# Patient Record
Sex: MALE | Race: WHITE | NOT HISPANIC OR LATINO | Employment: FULL TIME | URBAN - METROPOLITAN AREA
[De-identification: names, ages, dates, MRNs, and addresses within clinical notes are randomized per-mention and may not be internally consistent; named-entity substitution may affect disease eponyms.]

---

## 2019-03-13 ENCOUNTER — OFFICE VISIT (OUTPATIENT)
Dept: URGENT CARE | Facility: URGENT CARE | Age: 29
End: 2019-03-13
Payer: COMMERCIAL

## 2019-03-13 VITALS
WEIGHT: 185 LBS | DIASTOLIC BLOOD PRESSURE: 87 MMHG | TEMPERATURE: 98.2 F | HEART RATE: 62 BPM | OXYGEN SATURATION: 95 % | SYSTOLIC BLOOD PRESSURE: 141 MMHG

## 2019-03-13 DIAGNOSIS — S09.90XA INJURY OF HEAD, INITIAL ENCOUNTER: Primary | ICD-10-CM

## 2019-03-13 DIAGNOSIS — S01.01XA LACERATION OF SCALP, INITIAL ENCOUNTER: ICD-10-CM

## 2019-03-13 PROCEDURE — 12001 RPR S/N/AX/GEN/TRNK 2.5CM/<: CPT | Performed by: FAMILY MEDICINE

## 2019-03-14 NOTE — PATIENT INSTRUCTIONS
Patient Education     * Head Injury, no wake-up (Adult)    You have a head injury. It doesn t look serious right now. But symptoms of a more serious problem may appear later. This could be a mild brain injury (concussion), or bruising or bleeding in the brain. You or someone caring for you will need to watch for the symptoms listed below for at least the next 24 hours. When you get home, be sure to follow any care instructions you re given.  Home care  Watch for the following symptoms  Call 9-1-1 if you have any of these symptoms over the next hours or days:  1. Severe headache or headache that gets worse  2. Nausea and repeated throwing up (vomiting)  3. Dizziness or changes in eyesight (vision changes)  4. Bothered by bright light or loud noise  5. Sleep changes (trouble falling asleep or unusually sleepy or groggy)  6. Changes in the way you act or talk (personality or speech changes)  7. Feeling confused or forgetting things (memory loss)  8. Trouble walking or clumsiness  9. Passing out or fainting (even for a short time)  10. Won t wake up  11. Stiff neck  12. Weakness or numbness in any part of the body  13. Seizures  Do you have signs of a concussion?  A concussion is an injury to the brain caused by shaking. If you were knocked out, that s a sign you may have a concussion. But watch for these signs, too:    Upset stomach (nausea)    Throwing up (vomiting)    Feeling dizzy or confused    Headache    Loss of memory  If you have any of the above signs:    Don t return to sports or any activity where you might hurt your head again.    Wait until all symptoms have been gone for 2 full weeks, and your doctor has cleared you to return to sports.  You could get a serious brain injury if you get hurt again before fully recovering.  General care    You don t need to stay awake or be woken during the night.    For pain, you may use:  ? Tylenol (acetaminophen) 650 to 1000 mg every 6 hours OR  ? Motrin or Advil  (ibuprofen) 600 mg every 6 to 8 hours with food  NOTE: If you have chronic liver or kidney disease or ever had a stomach ulcer or GI bleeding, talk with your doctor before using these medicines.    Don t take aspirin after a head injury.    For swelling and to help with pain: Put a cold source to the injured area for up to 20 minutes at a time. Do this as often as directed. Use a cold pack or bag of ice wrapped in a thin towel. Never put a cold source directly on the skin.    For cuts and scrapes: Care for them as the doctor or nurse directed.    For the next 24 hours (or longer, if your doctor advises it):  ? Don t drink alcohol, use sedatives, or use other medicines that make you sleepy.  ? Don t drive or use large machines.  ? Don t do anything tiring, such as heavy lifting or straining.  ? Limit tasks where you need to focus or concentrate. This includes reading, using a smartphone or computer, watching TV and playing video games.  ? Don t return to sports or other activities that could cause another head injury.  Follow-up care  Follow up with your doctor if symptoms don t get better after 24 hours, or as directed.  When to call the doctor  Call your doctor right away if any of these occur:    Pain doesn t get better or gets worse    New or increased swelling or bruising    Fever of 100.4 F (38 C) or higher, or as directed by your doctor    Increased redness, warmth, draining or bleeding from the injury    Fluid drainage or bleeding from the nose or ears    Any pushed-in spot or bony bump in the injured area  Date Last Reviewed: 9/26/2015 2000-2018 [employer]. [address] [place] [address], [place] [address]. All rights reserved. This information is not intended as a substitute for professional medical care. Always follow your healthcare professional's instructions.  This information has been modified by your health care provider with permission from the publisher.  Modifications clinically reviewed  by Spencer Pichadro DO, MBA, FACOEP, Director of Physician Informatics for Emergency Medicine, [facility] on 8/27/18.           Patient Education     Scalp Laceration: Sutures or Staples  A laceration is a cut through the skin. A scalp laceration may require stitches (sutures) or staples. There are a lot of blood vessels in the scalp. Because of this, significant bleeding is common with scalp cuts.  Home care  The following guidelines will help you care for your laceration at home:    During the first 2 days you may carefully rinse your hair in the shower to remove blood and glass or dirt particles. After 2 days you may shower and shampoo your hair normally.    Have someone help you clean your wound every day:  ? In the shower, wash the area with soap and water. Use a wet cotton swab to loosen and remove any blood or crust that forms.  ? After cleaning, keep the wound clean and dry. Talk with your doctor about applying antibiotic ointment to the wound. Apply a fresh bandage.    Don't put your head underwater until the stitches or staples have been removed. This means no swimming.    Your doctor may prescribe an antibiotic cream or ointment to prevent infection. Do not stop taking this medication until you have finished the prescribed course or your doctor tells you to stop.    Your doctor may prescribe medications for pain. If no pain medicines were prescribed, you can use over-the-counter pain medicines. Follow instructions for taking these medications. Talk with your doctor before using these medicines if you have chronic liver or kidney disease. Also talk with your doctor if you have ever had a stomach ulcer or GI bleeding.  Follow-up care  Follow up with your healthcare provider, or as advised. Check the wound daily for the signs of infection listed below. Stitches or staples are usually removed from the scalp in about 7 to 14 days.  Call 911  Call 911 if any of these occur:    Bleeding can't be  controlled by direct pressure  When to seek medical advice  Call your healthcare provider right away if any of these occur:    Signs of infection, including increasing pain in the wound, redness, swelling, or pus coming from the wound    Fever of 100.4 F (38 C) or higher, or as directed by your healthcare provider    Stitches or staples come apart or fall out before your next appointment    Wound edges re-open  Date Last Reviewed: 10/1/2016    4102-5564 [employer]. [address] [place] [address]. All rights reserved. This information is not intended as a substitute for professional medical care. Always follow your healthcare professional's instructions.

## 2019-03-14 NOTE — PROGRESS NOTES
SUBJECTIVE:     Chief Complaint   Patient presents with     Urgent Care     Head Injury     Hit head on electrical box at around 7pm. today.      Kennedy Clifton is a 28 year old male who presents to the clinic with a laceration on the  scalp sustained 1.5 hour(s) ago.  This is a non-work related injury.    Mechanism of injury: HEAD HIT ELECTRICAL BOX WHEN HE WAS STANDING UP .  Family History   Problem Relation Age of Onset     Family History Negative Mother      Family History Negative Father        Associated symptoms: Denies numbness, weakness, or loss of function  Last tetanus booster within 10 years: no    EXAM:   The patient appears today in alert,no apparent distress distress  VITALS: /87   Pulse 62   Temp 98.2  F (36.8  C) (Tympanic)   Wt 83.9 kg (185 lb)   SpO2 95%     Size of laceration: 1.5 centimeters on the posterior scalp area   Alert and oriented  Left eye normal, pupil reactive, normal conjunctiva, normal fundus, normal cornea.  Right eye normal, pupil reactive, normal conjunctiva, normal fundus, normal cornea.  cv Regular rate and rhythm.   Chest - good effort , no sob   Characteristics of the laceration: active bleeding  Tendon function intact: not applicable  Sensation to light touch intact: yes  Pulses intact: not applicable  Picture included in patient's chart: no    Assessment:     Injury of head, initial encounter  Laceration of scalp, initial encounter    PLAN:  PROCEDURE NOTE::  LET was applied.  Wound cleaned with HIBICLENS  5 staples used for re-approximation  After care instructions:  Keep wound clean and dry for the next 24-48 hours  Staples out in 8 days  Signs of infection discussed today  Apply anti-bacterial ointment for 3 days  Discussed with patient that if notices any worsening headache or vision changes or worsening nausea symptoms should follow-up    Gwen Santacruz MD

## 2019-03-21 ENCOUNTER — OFFICE VISIT (OUTPATIENT)
Dept: URGENT CARE | Facility: URGENT CARE | Age: 29
End: 2019-03-21
Payer: COMMERCIAL

## 2019-03-21 DIAGNOSIS — Z53.9 DIAGNOSIS NOT YET DEFINED: ICD-10-CM

## 2019-09-22 PROBLEM — Z53.9 DIAGNOSIS NOT YET DEFINED: Status: ACTIVE | Noted: 2019-09-22

## 2023-07-08 ENCOUNTER — HOSPITAL ENCOUNTER (EMERGENCY)
Facility: CLINIC | Age: 33
Discharge: HOME | End: 2023-07-08
Attending: EMERGENCY MEDICINE | Admitting: EMERGENCY MEDICINE
Payer: COMMERCIAL

## 2023-07-08 ENCOUNTER — APPOINTMENT (OUTPATIENT)
Dept: GENERAL RADIOLOGY | Facility: CLINIC | Age: 33
End: 2023-07-08
Attending: EMERGENCY MEDICINE
Payer: COMMERCIAL

## 2023-07-08 VITALS
HEIGHT: 72 IN | HEART RATE: 61 BPM | DIASTOLIC BLOOD PRESSURE: 84 MMHG | BODY MASS INDEX: 24.38 KG/M2 | WEIGHT: 180 LBS | RESPIRATION RATE: 16 BRPM | OXYGEN SATURATION: 99 % | TEMPERATURE: 98 F | SYSTOLIC BLOOD PRESSURE: 135 MMHG

## 2023-07-08 DIAGNOSIS — S61.012A THUMB LACERATION, LEFT, INITIAL ENCOUNTER: ICD-10-CM

## 2023-07-08 DIAGNOSIS — S61.209A EXTENSOR TENDON LACERATION OF FINGER WITH OPEN WOUND, INITIAL ENCOUNTER: ICD-10-CM

## 2023-07-08 DIAGNOSIS — S56.429A EXTENSOR TENDON LACERATION OF FINGER WITH OPEN WOUND, INITIAL ENCOUNTER: ICD-10-CM

## 2023-07-08 PROCEDURE — 73140 X-RAY EXAM OF FINGER(S): CPT | Mod: LT

## 2023-07-08 PROCEDURE — 12002 RPR S/N/AX/GEN/TRNK2.6-7.5CM: CPT

## 2023-07-08 PROCEDURE — 99284 EMERGENCY DEPT VISIT MOD MDM: CPT | Mod: 25

## 2023-07-08 PROCEDURE — 29130 APPL FINGER SPLINT STATIC: CPT | Mod: LT

## 2023-07-08 ASSESSMENT — ACTIVITIES OF DAILY LIVING (ADL)
ADLS_ACUITY_SCORE: 35
ADLS_ACUITY_SCORE: 35

## 2023-07-08 NOTE — ED TRIAGE NOTES
Pt cut his left thumb today with a  today around 11am   Pt was cutting a piece of plastic and it cut the top of his thumb

## 2023-07-08 NOTE — ED PROVIDER NOTES
"  History     Chief Complaint:  Hand Injury     HPI   Kennedy Clifton is a 32 year old male who presents to the ED for evaluation of a laceration to his left thumb. States he sustained the wound while cutting a piece of plastic with a  denies other injuries. Denies medical problems. Patient writes with his left hand but \"does most things with his right\". Last tetanus shot was 01/04/2016. He denies any numbness, tingling, or weakness. He reports he can move the thumb normally. He denies any other symptoms.     Independent Historian:   None - Patient Only    Review of External Notes:  None    ROS:  See HPI    Allergies:  No Known Allergies     Physical Exam     Patient Vitals for the past 24 hrs:   BP Temp Temp src Pulse Resp SpO2 Height Weight   07/08/23 1200 (!) 154/77 98  F (36.7  C) Oral 99 16 99 % 1.829 m (6') 81.6 kg (180 lb)        Physical Exam  General: Resting comfortably  Head:  Scalp, face, and head appear normal  Eyes:  Pupils equal, round    Conjunctivae noninjected and sclera white  ENT:    The nose is normal    Ears/pinnae are normal  Neck:  Normal range of motion  MSK:  3cm laceration to the left thumb overlying the dorsal and medial proximal phalanx. No FB. + extensor tendon laceration. SILT in the fingertip. Remainder of hand and fingers otherwise normal and atraumatic  Skin:  No rash or lesions noted.  Neuro:  Speech is normal and fluent    Moves all extremities spontaneously  Psych: Awake, Alert. Normal affect      Appropriate interactions           Emergency Department Course     Imaging:  Fingers XR, 2-3 views, left   Final Result   IMPRESSION: Normal joint spaces and alignment. No fracture. No foreign body.            Report per radiology    Procedures    Digital Block       Procedure: Digital Block    Indication: Laceration    Consent: Verbal    Preparation: Povidone-iodine    Anesthesia: A digital block was performed with Bupivacaine - 0.5% on the affected digit.     Location: L " first (thumb) finger    Procedure Detail: A digital block was performed on the indicated digit with the above anesthetic.     Patient status: The patient tolerated the procedure well: Yes. There were no complications.      Laceration Repair      Procedure: Laceration Repair    Indication: Laceration    Consent: Verbal    Location: Left Thumb    Length: 3 cm    Preparation: Irrigation with Sterile Saline.    Anesthesia/Sedation: Digital block as noted above.      Treatment/Exploration: Wound explored, no foreign bodies found  - Complete extensor tendon laceration.     Closure: The wound was closed with one layer. Skin/superficial layer was closed with 6 x 4-0 Nylon using Interrupted sutures.     Patient Status: The patient tolerated the procedure well: Yes. There were no complications.          Emergency Department Course & Assessments:       Interventions:  Medications - No data to display     Assessments, Independent Interpretation, Consult/Discussion of ManagementTests:  ED Course as of 07/08/23 1548   Sat Jul 08, 2023   1422 I performed an independent rotation of the patient's left finger radiographs there is no evidence of fracture or dislocation.  No foreign body.   1455 Performed a digital block procedure, see procedure note above.   1505 I performed a laceration repair and splint procedure, see procedure notes above.   1540 I rechecked the patient. I believe that they are safe for discharge at this time.       Social Determinants of Health affecting care:  None    Disposition:  The patient was discharged to home.     Impression & Plan      Medical Decision Making:  Kennedy Clifton is a 32 year old male who presents to the ED for evaluation of laceration to the left thumb. Tetanus within the last 10 years and is up to date. Radiographs negative for fracture, FB, or other bony trauma. Exploration of the wound with finger tournicot demonstrates a complete extensor tendon laceration. No neurovascular  compromise. Wound repaired as above. Patient placed in aluminum foam finger splint with thumb in extension.  Patient will need close follow-up with orthopedic hand specialist for possible repair of the extensor tendon.  Patient does not appear to have significant functional limitation despite the tendon laceration, although exam is somewhat limited due to the presence of acute injury and laceration.  I stressed the importance of close follow-up.  Patient is agreeable to plan of care.  He should wear the splint at all times and avoid bending the finger during dressing changes.  Patient is agreeable to plan of care.  Close return precautions were provided and he was discharged in stable and improved condition..      Diagnosis:    ICD-10-CM    1. Thumb laceration, left, initial encounter  S61.012A       2. Extensor tendon laceration of finger with open wound, initial encounter  S56.429A     S61.209A         7/8/2023   Hilario Daniels MD       Historical Data:  ______________________________________________________________________  Medications:    The patient denies any current medications.    Past Medical History:   Past Surgical History:     The patient denies any prior medical history. The patient denies any prior surgical history.         Family History:    The patient denies any prior medical history. Social History:  Presents with a family member.     PCP: [facility]            Hilario Daniels MD  07/08/23 3951

## 2023-07-08 NOTE — DISCHARGE INSTRUCTIONS
Wear the thumb splint at all times and keep the finger straight. To not flex/bend the thumb. Sutures should be removed at the instruction of the hand specialist.

## 2024-01-07 ENCOUNTER — HEALTH MAINTENANCE LETTER (OUTPATIENT)
Age: 34
End: 2024-01-07

## 2025-01-25 ENCOUNTER — HEALTH MAINTENANCE LETTER (OUTPATIENT)
Age: 35
End: 2025-01-25